# Patient Record
Sex: FEMALE | Race: WHITE | NOT HISPANIC OR LATINO | URBAN - METROPOLITAN AREA
[De-identification: names, ages, dates, MRNs, and addresses within clinical notes are randomized per-mention and may not be internally consistent; named-entity substitution may affect disease eponyms.]

---

## 2018-11-27 VITALS
RESPIRATION RATE: 16 BRPM | DIASTOLIC BLOOD PRESSURE: 79 MMHG | WEIGHT: 139.99 LBS | SYSTOLIC BLOOD PRESSURE: 148 MMHG | HEART RATE: 60 BPM | TEMPERATURE: 98 F | HEIGHT: 64 IN | OXYGEN SATURATION: 99 %

## 2018-11-27 RX ORDER — CHLORHEXIDINE GLUCONATE 213 G/1000ML
1 SOLUTION TOPICAL ONCE
Qty: 0 | Refills: 0 | Status: DISCONTINUED | OUTPATIENT
Start: 2018-11-28 | End: 2018-11-29

## 2018-11-27 NOTE — H&P ADULT - FAMILY HISTORY
Mother  Still living? Unknown  Family history of heart attack, Age at diagnosis: Age Unknown  Family history of breast cancer, Age at diagnosis: Age Unknown

## 2018-11-27 NOTE — H&P ADULT - PSH
Cholelithiasis    History of uterine fibroid Cholelithiasis  gallbladder surgery in   H/O umbilical hernia repair  in   H/O:   in ,   History of uterine fibroid

## 2018-11-27 NOTE — H&P ADULT - ASSESSMENT
69 y/o former smoker F with PMHx of HTN, HLD, CAD (CA Score of 490) who presents for cardiac catheterization w/ possible intervention in the setting of pts risk factors, Calcium Score of 490 and abnormal NST.  ASA III  Mallampati II    Risks & benefits of procedure and alternative therapy have been explained to the patient including but not limited to: allergic reaction, bleeding w/possible need for blood transfusion, infection, renal and vascular compromise, limb damage, arrhythmia, stroke, vessel dissection/perforation, Myocardial infarction, emergent CABG. Informed consent obtained and in chart.       Precath/ Consented  Pt presents as euvolemic on exam in the setting of EF 74%. IVF Hydration: NS @ 75 cc/hr  As d/w with Dr. Perez, pt is loaded with ASA 325mg PO x 1 STAT and Plavix 600mg PO x 1 STAT pre-procedure. 67 y/o former smoker F with PMHx of HTN, HLD, CAD (CA Score of 490) who presents for cardiac catheterization w/ possible intervention in the setting of pts risk factors, Calcium Score of 490 and abnormal NST.  ASA III  Mallampati II    Risks & benefits of procedure and alternative therapy have been explained to the patient including but not limited to: allergic reaction, bleeding w/possible need for blood transfusion, infection, renal and vascular compromise, limb damage, arrhythmia, stroke, vessel dissection/perforation, Myocardial infarction, emergent CABG. Informed consent obtained and in chart.       Precath/ Consented  Pt presents as euvolemic on exam in the setting of EF 74%. IVF Hydration: NS @ 75 cc/hr  Today's H/H is 13.7/40.7 and pt denies active bleeding. As d/w with Dr. Perez, pt is loaded with ASA 325mg PO x 1 STAT and Plavix 600mg PO x 1 STAT pre-procedure. 67 y/o former smoker F with PMHx of HTN, HLD, CAD (CA Score of 490) who presents for cardiac catheterization w/ possible intervention in the setting of pts risk factors, Calcium Score of 490 and abnormal NST.  ASA III  Mallampati II    Risks & benefits of procedure and alternative therapy have been explained to the patient including but not limited to: allergic reaction, bleeding w/possible need for blood transfusion, infection, renal and vascular compromise, limb damage, arrhythmia, stroke, vessel dissection/perforation, Myocardial infarction, emergent CABG. Informed consent obtained and in chart.       Precath/ Consented  Pt presents as euvolemic on exam in the setting of EF 74%. IVF Hydration: NS @ 75 cc/hr  Today's H/H is 13.7/40.7 and pt denies active bleeding. Pt reports compliance to at home DAPT regime and took her daily ASA 81mg last night at 9pm and her daily Plavix 75mg this AM. As d/w with Dr. Perez, pt is loaded with ASA 325mg PO x 1 STAT pre-procedure.

## 2018-11-27 NOTE — H&P ADULT - RS GEN PE MLT RESP DETAILS PC
no rhonchi/respirations non-labored/breath sounds equal/clear to auscultation bilaterally/no rales/good air movement

## 2018-11-27 NOTE — H&P ADULT - HISTORY OF PRESENT ILLNESS
SDA Skeleton  *Confirm Medications on Arrival    y/o  who presented to their cardiologist c/o…  Pt had a …  Pt denies … CP, SOB, dizziness, diaphoresis, palpitations, fatigue, LE edema, orthopnea, PND, syncope  Due to pts risk factors, CCS Angina Class _ Sx, abnormal __ , pt is referred for cardiac catheterization w/ possible intervention. *Confirm Medications on Arrival- SKELETON    67 y/o former smoker F with PMHx of HTN, HLD, CAD (CA Score of 490) who presented to their cardiologist c/o… possible pre-op?  NST (11/17/18): EF 74%. Compared with prior study dated in 2011, exertional chest discomfort and exertional ST segment depressions are now seen on the current study, not seen in 2011. The scans are normal on both studies.   Pt denies … CP, SOB, dizziness, diaphoresis, palpitations, fatigue, LE edema, orthopnea, PND, syncope  Due to pts risk factors, CCS Angina Class _ Sx, abnormal NST, pt is referred for cardiac catheterization w/ possible intervention. *Confirm Medications on Arrival- SKELETON    67 y/o former smoker F with PMHx of HTN, HLD, CAD (CA Score of 490) who presented to their cardiologist c/o… as per MD note, possible pre-op?  NST (11/17/18): EF 74%. Compared with prior study dated in 2011, exertional chest discomfort and exertional ST segment depressions are now seen on the current study, not seen in 2011. The scans are normal on both studies.   Pt denies … CP, SOB, dizziness, diaphoresis, palpitations, fatigue, LE edema, orthopnea, PND, syncope  Due to pts risk factors, CCS Angina Class _ Sx, abnormal NST, pt is referred for cardiac catheterization w/ possible intervention. 67 y/o former smoker F with PMHx of HTN, HLD, CAD (CA Score of 490) who presented to their cardiologist as a F/U appointment secondary to receiving results of her calcium score. Pt denies CP, SOB, dizziness, diaphoresis, palpitations, fatigue, LE edema, orthopnea, PND, syncope.  NST (11/17/18): EF 74%. Compared with prior study dated in 2011, exertional chest discomfort and exertional ST segment depressions are now seen on the current study, not seen in 2011. The scans are normal on both studies.   Due to pts risk factors, Calcium Score of 490 and abnormal NST, pt is referred for cardiac catheterization w/ possible intervention.

## 2018-11-27 NOTE — H&P ADULT - NSHPLABSRESULTS_GEN_ALL_CORE
13.7   5.0   )-----------( 182      ( 28 Nov 2018 07:40 )             40.7  PT/INR - ( 28 Nov 2018 07:40 )   PT: 10.8 sec;   INR: 0.96          PTT - ( 28 Nov 2018 07:40 )  PTT:30.6 sec

## 2018-11-28 ENCOUNTER — OUTPATIENT (OUTPATIENT)
Dept: OUTPATIENT SERVICES | Facility: HOSPITAL | Age: 68
LOS: 1 days | Discharge: NURSING, REHAB OR LONG-TERM CARE | End: 2018-11-28
Payer: COMMERCIAL

## 2018-11-28 DIAGNOSIS — Z98.891 HISTORY OF UTERINE SCAR FROM PREVIOUS SURGERY: Chronic | ICD-10-CM

## 2018-11-28 DIAGNOSIS — Z98.890 OTHER SPECIFIED POSTPROCEDURAL STATES: Chronic | ICD-10-CM

## 2018-11-28 DIAGNOSIS — Z86.018 PERSONAL HISTORY OF OTHER BENIGN NEOPLASM: Chronic | ICD-10-CM

## 2018-11-28 DIAGNOSIS — K80.20 CALCULUS OF GALLBLADDER WITHOUT CHOLECYSTITIS WITHOUT OBSTRUCTION: Chronic | ICD-10-CM

## 2018-11-28 LAB
ANION GAP SERPL CALC-SCNC: 9 MMOL/L — SIGNIFICANT CHANGE UP (ref 5–17)
APTT BLD: 30.6 SEC — SIGNIFICANT CHANGE UP (ref 27.5–36.3)
BASOPHILS NFR BLD AUTO: 0.6 % — SIGNIFICANT CHANGE UP (ref 0–2)
BUN SERPL-MCNC: 18 MG/DL — SIGNIFICANT CHANGE UP (ref 7–23)
CALCIUM SERPL-MCNC: 9.3 MG/DL — SIGNIFICANT CHANGE UP (ref 8.4–10.5)
CHLORIDE SERPL-SCNC: 107 MMOL/L — SIGNIFICANT CHANGE UP (ref 96–108)
CHOLEST SERPL-MCNC: 136 MG/DL — SIGNIFICANT CHANGE UP (ref 10–199)
CK MB CFR SERPL CALC: 2.3 NG/ML — SIGNIFICANT CHANGE UP (ref 0–6.7)
CO2 SERPL-SCNC: 26 MMOL/L — SIGNIFICANT CHANGE UP (ref 22–31)
CREAT SERPL-MCNC: 0.73 MG/DL — SIGNIFICANT CHANGE UP (ref 0.5–1.3)
CRP SERPL-MCNC: <0.03 MG/DL — SIGNIFICANT CHANGE UP (ref 0–0.4)
EOSINOPHIL NFR BLD AUTO: 2.6 % — SIGNIFICANT CHANGE UP (ref 0–6)
GLUCOSE SERPL-MCNC: 120 MG/DL — HIGH (ref 70–99)
HBA1C BLD-MCNC: 5.7 % — HIGH (ref 4–5.6)
HCT VFR BLD CALC: 40.7 % — SIGNIFICANT CHANGE UP (ref 34.5–45)
HDLC SERPL-MCNC: 47 MG/DL — LOW
HGB BLD-MCNC: 13.7 G/DL — SIGNIFICANT CHANGE UP (ref 11.5–15.5)
INR BLD: 0.96 — SIGNIFICANT CHANGE UP (ref 0.88–1.16)
LIPID PNL WITH DIRECT LDL SERPL: 73 MG/DL — SIGNIFICANT CHANGE UP
LYMPHOCYTES # BLD AUTO: 34.9 % — SIGNIFICANT CHANGE UP (ref 13–44)
MCHC RBC-ENTMCNC: 29.2 PG — SIGNIFICANT CHANGE UP (ref 27–34)
MCHC RBC-ENTMCNC: 33.7 G/DL — SIGNIFICANT CHANGE UP (ref 32–36)
MCV RBC AUTO: 86.8 FL — SIGNIFICANT CHANGE UP (ref 80–100)
MONOCYTES NFR BLD AUTO: 5.8 % — SIGNIFICANT CHANGE UP (ref 2–14)
NEUTROPHILS NFR BLD AUTO: 56.1 % — SIGNIFICANT CHANGE UP (ref 43–77)
PLATELET # BLD AUTO: 182 K/UL — SIGNIFICANT CHANGE UP (ref 150–400)
POTASSIUM SERPL-MCNC: 4.3 MMOL/L — SIGNIFICANT CHANGE UP (ref 3.5–5.3)
POTASSIUM SERPL-SCNC: 4.3 MMOL/L — SIGNIFICANT CHANGE UP (ref 3.5–5.3)
PROTHROM AB SERPL-ACNC: 10.8 SEC — SIGNIFICANT CHANGE UP (ref 10–12.9)
RBC # BLD: 4.69 M/UL — SIGNIFICANT CHANGE UP (ref 3.8–5.2)
RBC # FLD: 12.8 % — SIGNIFICANT CHANGE UP (ref 10.3–16.9)
SODIUM SERPL-SCNC: 142 MMOL/L — SIGNIFICANT CHANGE UP (ref 135–145)
TOTAL CHOLESTEROL/HDL RATIO MEASUREMENT: 2.9 RATIO — LOW (ref 3.3–7.1)
TRIGL SERPL-MCNC: 80 MG/DL — SIGNIFICANT CHANGE UP (ref 10–149)
WBC # BLD: 5 K/UL — SIGNIFICANT CHANGE UP (ref 3.8–10.5)
WBC # FLD AUTO: 5 K/UL — SIGNIFICANT CHANGE UP (ref 3.8–10.5)

## 2018-11-28 PROCEDURE — 82553 CREATINE MB FRACTION: CPT

## 2018-11-28 PROCEDURE — C1769: CPT

## 2018-11-28 PROCEDURE — 93458 L HRT ARTERY/VENTRICLE ANGIO: CPT | Mod: 26

## 2018-11-28 PROCEDURE — C1889: CPT

## 2018-11-28 PROCEDURE — C1887: CPT

## 2018-11-28 PROCEDURE — 93005 ELECTROCARDIOGRAM TRACING: CPT

## 2018-11-28 PROCEDURE — 83036 HEMOGLOBIN GLYCOSYLATED A1C: CPT

## 2018-11-28 PROCEDURE — 85025 COMPLETE CBC W/AUTO DIFF WBC: CPT

## 2018-11-28 PROCEDURE — C1894: CPT

## 2018-11-28 PROCEDURE — C1760: CPT

## 2018-11-28 PROCEDURE — 80061 LIPID PANEL: CPT

## 2018-11-28 PROCEDURE — 93010 ELECTROCARDIOGRAM REPORT: CPT

## 2018-11-28 PROCEDURE — 82550 ASSAY OF CK (CPK): CPT

## 2018-11-28 PROCEDURE — 80048 BASIC METABOLIC PNL TOTAL CA: CPT

## 2018-11-28 PROCEDURE — 93458 L HRT ARTERY/VENTRICLE ANGIO: CPT

## 2018-11-28 PROCEDURE — 85610 PROTHROMBIN TIME: CPT

## 2018-11-28 PROCEDURE — 85730 THROMBOPLASTIN TIME PARTIAL: CPT

## 2018-11-28 PROCEDURE — 86140 C-REACTIVE PROTEIN: CPT

## 2018-11-28 RX ORDER — CLOPIDOGREL BISULFATE 75 MG/1
1 TABLET, FILM COATED ORAL
Qty: 0 | Refills: 0 | COMMUNITY

## 2018-11-28 RX ORDER — CLOPIDOGREL BISULFATE 75 MG/1
600 TABLET, FILM COATED ORAL ONCE
Qty: 0 | Refills: 0 | Status: DISCONTINUED | OUTPATIENT
Start: 2018-11-28 | End: 2018-11-28

## 2018-11-28 RX ORDER — METOPROLOL TARTRATE 50 MG
1 TABLET ORAL
Qty: 0 | Refills: 0 | COMMUNITY

## 2018-11-28 RX ORDER — SODIUM CHLORIDE 9 MG/ML
500 INJECTION INTRAMUSCULAR; INTRAVENOUS; SUBCUTANEOUS
Qty: 0 | Refills: 0 | Status: DISCONTINUED | OUTPATIENT
Start: 2018-11-28 | End: 2018-11-28

## 2018-11-28 RX ORDER — ASPIRIN/CALCIUM CARB/MAGNESIUM 324 MG
1 TABLET ORAL
Qty: 0 | Refills: 0 | COMMUNITY

## 2018-11-28 RX ORDER — SODIUM CHLORIDE 9 MG/ML
500 INJECTION INTRAMUSCULAR; INTRAVENOUS; SUBCUTANEOUS
Qty: 0 | Refills: 0 | Status: DISCONTINUED | OUTPATIENT
Start: 2018-11-28 | End: 2018-11-29

## 2018-11-28 RX ORDER — ATORVASTATIN CALCIUM 80 MG/1
1 TABLET, FILM COATED ORAL
Qty: 0 | Refills: 0 | COMMUNITY

## 2018-11-28 RX ORDER — ASPIRIN/CALCIUM CARB/MAGNESIUM 324 MG
325 TABLET ORAL ONCE
Qty: 0 | Refills: 0 | Status: COMPLETED | OUTPATIENT
Start: 2018-11-28 | End: 2018-11-28

## 2018-11-28 RX ADMIN — Medication 325 MILLIGRAM(S): at 08:24

## 2018-11-28 RX ADMIN — SODIUM CHLORIDE 75 MILLILITER(S): 9 INJECTION INTRAMUSCULAR; INTRAVENOUS; SUBCUTANEOUS at 08:23

## 2018-11-28 NOTE — PROGRESS NOTE ADULT - SUBJECTIVE AND OBJECTIVE BOX
Procedure: LHC, Angioseal  Indication: UA, CAD  Complication: none    Result:  1) Non-obstructive CAD (30% pLAD)  2) Normal LV function, EF 65%, LVEDP 9        Plan: Medical management. D/C home - to f/u with Dr. Callahan

## 2018-11-28 NOTE — PROGRESS NOTE ADULT - SUBJECTIVE AND OBJECTIVE BOX
Interventional Cardiology PA SDA Discharge Note    Patient without complaints. Ambulated and voided without difficulties    Afebrile, VSS    Ext:    		Right Groin:  No     hematoma, no  bruit, dressing; C/D/I  		  Pulses:    intact DP/PT to baseline     A/P:                  1.	Stable for discharge as per attending Dr. Perez after bed rest, pt voids, groin stable and 30 minutes of ambulation.  2.	Follow-up with PMD/Cardiologist Dr. Callahan in 1-2 weeks  3.	Discharged forms signed and copies in chart Interventional Cardiology PA SDA Discharge Note    Patient without complaints. Ambulated and voided without difficulties    Afebrile, VSS    Ext:    		Right Groin:  No     hematoma, no  bruit, dressing; C/D/I  		  Pulses:    intact DP/PT to baseline     A/P:    69 y/o former smoker F with PMHx of HTN, HLD, CAD, who presented to their cardiologist for routine follow-up. Due to pts risk factors and elevated calcium Score of 490, the pt was referred for cardiac catheterization w/ possible intervention. She is now s/p cardiac cath 11/28/18 showing non-obstructive CAD (30% pLAD), normal LV function, EF 65%, LVEDP 9, right groin Angioseal.     1.	Stable for discharge as per attending Dr. Perez after bed rest, pt voids, groin stable and 30 minutes of ambulation.  2.	Follow-up with PMD/Cardiologist Dr. Callahan in 1-2 weeks  3.	Discharged forms signed and copies in chart

## 2020-05-28 NOTE — PROGRESS NOTE ADULT - REASON FOR ADMISSION
Abnormal NST
Abnormal NST
The patient has been re-examined and I agree with the above assessment or I updated with my findings.